# Patient Record
Sex: FEMALE | Race: WHITE | Employment: PART TIME | URBAN - NONMETROPOLITAN AREA
[De-identification: names, ages, dates, MRNs, and addresses within clinical notes are randomized per-mention and may not be internally consistent; named-entity substitution may affect disease eponyms.]

---

## 2020-08-28 ENCOUNTER — HOSPITAL ENCOUNTER (INPATIENT)
Age: 20
LOS: 4 days | Discharge: HOME | DRG: 885 | End: 2020-09-01
Attending: PSYCHIATRY & NEUROLOGY | Admitting: PSYCHIATRY & NEUROLOGY
Payer: COMMERCIAL

## 2020-08-28 PROBLEM — F32.9 MAJOR DEPRESSION, CHRONIC: Status: ACTIVE | Noted: 2020-08-28

## 2020-08-28 LAB
ALBUMIN SERPL-MCNC: 4.6 G/DL (ref 3.5–5.1)
ALP BLD-CCNC: 70 U/L (ref 38–126)
ALT SERPL-CCNC: 18 U/L (ref 11–66)
AMPHETAMINE+METHAMPHETAMINE URINE SCREEN: NEGATIVE
ANION GAP SERPL CALCULATED.3IONS-SCNC: 9 MEQ/L (ref 8–16)
AST SERPL-CCNC: 19 U/L (ref 5–40)
BARBITURATE QUANTITATIVE URINE: NEGATIVE
BASOPHILS # BLD: 0.2 %
BASOPHILS ABSOLUTE: 0 THOU/MM3 (ref 0–0.1)
BENZODIAZEPINE QUANTITATIVE URINE: NEGATIVE
BILIRUB SERPL-MCNC: 0.4 MG/DL (ref 0.3–1.2)
BILIRUBIN DIRECT: < 0.2 MG/DL (ref 0–0.3)
BILIRUBIN URINE: NEGATIVE
BLOOD, URINE: NEGATIVE
BUN BLDV-MCNC: 10 MG/DL (ref 7–22)
CALCIUM SERPL-MCNC: 10.1 MG/DL (ref 8.5–10.5)
CANNABINOID QUANTITATIVE URINE: NEGATIVE
CHARACTER, URINE: CLEAR
CHLORIDE BLD-SCNC: 104 MEQ/L (ref 98–111)
CO2: 25 MEQ/L (ref 23–33)
COCAINE METABOLITE QUANTITATIVE URINE: NEGATIVE
COLOR: YELLOW
CREAT SERPL-MCNC: 0.9 MG/DL (ref 0.4–1.2)
EOSINOPHIL # BLD: 3.7 %
EOSINOPHILS ABSOLUTE: 0.3 THOU/MM3 (ref 0–0.4)
ERYTHROCYTE [DISTWIDTH] IN BLOOD BY AUTOMATED COUNT: 13.1 % (ref 11.5–14.5)
ERYTHROCYTE [DISTWIDTH] IN BLOOD BY AUTOMATED COUNT: 43.8 FL (ref 35–45)
ETHYL ALCOHOL, SERUM: < 0.01 %
GFR SERPL CREATININE-BSD FRML MDRD: 79 ML/MIN/1.73M2
GLUCOSE BLD-MCNC: 97 MG/DL (ref 70–108)
GLUCOSE URINE: NEGATIVE MG/DL
HCT VFR BLD CALC: 46.1 % (ref 37–47)
HEMOGLOBIN: 15 GM/DL (ref 12–16)
IMMATURE GRANS (ABS): 0.02 THOU/MM3 (ref 0–0.07)
IMMATURE GRANULOCYTES: 0.2 %
KETONES, URINE: NEGATIVE
LEUKOCYTE ESTERASE, URINE: NEGATIVE
LYMPHOCYTES # BLD: 27.7 %
LYMPHOCYTES ABSOLUTE: 2.5 THOU/MM3 (ref 1–4.8)
MAGNESIUM: 2.2 MG/DL (ref 1.6–2.4)
MCH RBC QN AUTO: 29.8 PG (ref 26–33)
MCHC RBC AUTO-ENTMCNC: 32.5 GM/DL (ref 32.2–35.5)
MCV RBC AUTO: 91.7 FL (ref 81–99)
MONOCYTES # BLD: 6.9 %
MONOCYTES ABSOLUTE: 0.6 THOU/MM3 (ref 0.4–1.3)
NITRITE, URINE: NEGATIVE
NUCLEATED RED BLOOD CELLS: 0 /100 WBC
OPIATES, URINE: NEGATIVE
OSMOLALITY CALCULATION: 274.6 MOSMOL/KG (ref 275–300)
OXYCODONE: NEGATIVE
PH UA: 6 (ref 5–9)
PHENCYCLIDINE QUANTITATIVE URINE: NEGATIVE
PLATELET # BLD: 385 THOU/MM3 (ref 130–400)
PMV BLD AUTO: 9.2 FL (ref 9.4–12.4)
POTASSIUM SERPL-SCNC: 4.6 MEQ/L (ref 3.5–5.2)
PREGNANCY, SERUM: NEGATIVE
PROTEIN UA: NEGATIVE
RBC # BLD: 5.03 MILL/MM3 (ref 4.2–5.4)
SEG NEUTROPHILS: 61.3 %
SEGMENTED NEUTROPHILS ABSOLUTE COUNT: 5.6 THOU/MM3 (ref 1.8–7.7)
SODIUM BLD-SCNC: 138 MEQ/L (ref 135–145)
SPECIFIC GRAVITY, URINE: 1.01 (ref 1–1.03)
TOTAL PROTEIN: 7.9 G/DL (ref 6.1–8)
UROBILINOGEN, URINE: 0.2 EU/DL (ref 0–1)
WBC # BLD: 9.2 THOU/MM3 (ref 4.8–10.8)

## 2020-08-28 PROCEDURE — 1240000000 HC EMOTIONAL WELLNESS R&B

## 2020-08-28 PROCEDURE — 99284 EMERGENCY DEPT VISIT MOD MDM: CPT

## 2020-08-28 PROCEDURE — 85025 COMPLETE CBC W/AUTO DIFF WBC: CPT

## 2020-08-28 PROCEDURE — 6370000000 HC RX 637 (ALT 250 FOR IP): Performed by: PSYCHIATRY & NEUROLOGY

## 2020-08-28 PROCEDURE — 99285 EMERGENCY DEPT VISIT HI MDM: CPT

## 2020-08-28 PROCEDURE — 80307 DRUG TEST PRSMV CHEM ANLYZR: CPT

## 2020-08-28 PROCEDURE — 81003 URINALYSIS AUTO W/O SCOPE: CPT

## 2020-08-28 PROCEDURE — 36415 COLL VENOUS BLD VENIPUNCTURE: CPT

## 2020-08-28 PROCEDURE — 84703 CHORIONIC GONADOTROPIN ASSAY: CPT

## 2020-08-28 PROCEDURE — 83735 ASSAY OF MAGNESIUM: CPT

## 2020-08-28 PROCEDURE — G0480 DRUG TEST DEF 1-7 CLASSES: HCPCS

## 2020-08-28 PROCEDURE — 82248 BILIRUBIN DIRECT: CPT

## 2020-08-28 PROCEDURE — 80053 COMPREHEN METABOLIC PANEL: CPT

## 2020-08-28 PROCEDURE — 6370000000 HC RX 637 (ALT 250 FOR IP): Performed by: PHYSICIAN ASSISTANT

## 2020-08-28 RX ORDER — VENLAFAXINE HYDROCHLORIDE 150 MG/1
150 CAPSULE, EXTENDED RELEASE ORAL
Status: DISCONTINUED | OUTPATIENT
Start: 2020-08-29 | End: 2020-08-29

## 2020-08-28 RX ORDER — FERROUS SULFATE 325(65) MG
325 TABLET ORAL
Status: DISCONTINUED | OUTPATIENT
Start: 2020-08-29 | End: 2020-09-01 | Stop reason: HOSPADM

## 2020-08-28 RX ORDER — FERROUS SULFATE 325(65) MG
325 TABLET ORAL
COMMUNITY

## 2020-08-28 RX ORDER — VENLAFAXINE HYDROCHLORIDE 150 MG/1
150 CAPSULE, EXTENDED RELEASE ORAL NIGHTLY
Status: ON HOLD | COMMUNITY
End: 2020-09-01 | Stop reason: HOSPADM

## 2020-08-28 RX ORDER — MAGNESIUM HYDROXIDE/ALUMINUM HYDROXICE/SIMETHICONE 120; 1200; 1200 MG/30ML; MG/30ML; MG/30ML
30 SUSPENSION ORAL EVERY 6 HOURS PRN
Status: DISCONTINUED | OUTPATIENT
Start: 2020-08-28 | End: 2020-09-01 | Stop reason: HOSPADM

## 2020-08-28 RX ORDER — SUMATRIPTAN 25 MG/1
25 TABLET, FILM COATED ORAL
COMMUNITY

## 2020-08-28 RX ORDER — SUMATRIPTAN 50 MG/1
25 TABLET, FILM COATED ORAL
Status: DISPENSED | OUTPATIENT
Start: 2020-08-28 | End: 2020-08-28

## 2020-08-28 RX ORDER — BUPROPION HYDROCHLORIDE 300 MG/1
300 TABLET ORAL EVERY MORNING
COMMUNITY

## 2020-08-28 RX ORDER — TRAZODONE HYDROCHLORIDE 50 MG/1
50 TABLET ORAL NIGHTLY PRN
Status: DISCONTINUED | OUTPATIENT
Start: 2020-08-28 | End: 2020-09-01 | Stop reason: HOSPADM

## 2020-08-28 RX ORDER — BUPROPION HYDROCHLORIDE 300 MG/1
300 TABLET ORAL EVERY MORNING
Status: DISCONTINUED | OUTPATIENT
Start: 2020-08-29 | End: 2020-09-01 | Stop reason: HOSPADM

## 2020-08-28 RX ORDER — HYDROXYZINE HYDROCHLORIDE 25 MG/1
50 TABLET, FILM COATED ORAL 3 TIMES DAILY PRN
Status: DISCONTINUED | OUTPATIENT
Start: 2020-08-28 | End: 2020-09-01 | Stop reason: HOSPADM

## 2020-08-28 RX ORDER — ACETAMINOPHEN 325 MG/1
650 TABLET ORAL EVERY 4 HOURS PRN
Status: DISCONTINUED | OUTPATIENT
Start: 2020-08-28 | End: 2020-09-01 | Stop reason: HOSPADM

## 2020-08-28 RX ORDER — IBUPROFEN 200 MG
400 TABLET ORAL EVERY 6 HOURS PRN
Status: DISCONTINUED | OUTPATIENT
Start: 2020-08-28 | End: 2020-09-01 | Stop reason: HOSPADM

## 2020-08-28 RX ADMIN — METFORMIN HYDROCHLORIDE 500 MG: 500 TABLET ORAL at 17:15

## 2020-08-28 RX ADMIN — TRAZODONE HYDROCHLORIDE 50 MG: 50 TABLET ORAL at 21:02

## 2020-08-28 RX ADMIN — HYDROXYZINE HYDROCHLORIDE 50 MG: 25 TABLET ORAL at 21:02

## 2020-08-28 ASSESSMENT — SLEEP AND FATIGUE QUESTIONNAIRES
DO YOU HAVE DIFFICULTY SLEEPING: YES
DIFFICULTY FALLING ASLEEP: NO
DIFFICULTY ARISING: NO
DO YOU USE A SLEEP AID: YES
AVERAGE NUMBER OF SLEEP HOURS: 6
DO YOU USE A SLEEP AID: NO
DIFFICULTY STAYING ASLEEP: YES
RESTFUL SLEEP: NO
DIFFICULTY ARISING: NO
DO YOU HAVE DIFFICULTY SLEEPING: YES
DIFFICULTY STAYING ASLEEP: YES
SLEEP PATTERN: RESTLESSNESS
AVERAGE NUMBER OF SLEEP HOURS: 7
RESTFUL SLEEP: NO
SLEEP PATTERN: DISTURBED/INTERRUPTED SLEEP
DIFFICULTY FALLING ASLEEP: NO

## 2020-08-28 ASSESSMENT — PATIENT HEALTH QUESTIONNAIRE - PHQ9
SUM OF ALL RESPONSES TO PHQ QUESTIONS 1-9: 19
SUM OF ALL RESPONSES TO PHQ QUESTIONS 1-9: 18
SUM OF ALL RESPONSES TO PHQ QUESTIONS 1-9: 22

## 2020-08-28 ASSESSMENT — ENCOUNTER SYMPTOMS
SORE THROAT: 0
EYE ITCHING: 0
EYE DISCHARGE: 0
WHEEZING: 0
DIARRHEA: 0
ABDOMINAL PAIN: 0
COUGH: 0
EYE PAIN: 0
VOMITING: 0
SHORTNESS OF BREATH: 0
RHINORRHEA: 0
NAUSEA: 0
BACK PAIN: 0
COLOR CHANGE: 0

## 2020-08-28 ASSESSMENT — LIFESTYLE VARIABLES
HISTORY_ALCOHOL_USE: NO
HISTORY_ALCOHOL_USE: NO

## 2020-08-28 NOTE — PROGRESS NOTES
Provisional Diagnosis:    Unspecified Depressive Disorder     Risk, Psychosocial and Contextual Factors:  Adjusting to online schooling due to the pandemic, work, problems with primary support system    Current MH Treatment:  Dr. Rosalinda Rahman (specialize in psychology, counsels pt). Psychotropic medication prescribed by PCP     Present Suicidal Behavior:      Verbal:  Denies current suicidal thoughts. Pt report daily, intermittent suicidal thoughts during the last two weeks        Attempt: Denies    Access to Weapons:  Pt state there are guns in the home \"but I don't know how to use them\"    Current Suicide Risk: Low, Moderate or High:    High    Past Suicidal Behavior:       Verbal: X    Attempt: Denies     Self-Injurious/Self-Mutilation:  Denies     Traumatic Event Within Past 2 Weeks:   Denies     Current Abuse:  Denies     Legal:  Denies     Violence: Denies. Pt had thoughts of wanting to kill her father a year ago, no plan. Pt state this was due to her father cheating on her mother who is still not aware of the infidelity    Protective Factors:  Pt is linked to counseling services     Housing:    Pt resides with her mother, father, sister and brother     CPAP/Oxygen/Ambulation Difficulties:  Denies     Basic Vital Signs Normal?: Check with Patients Nurse prior to [address]?: Check with Patients Nurse prior to Calling Psychiatry    Clinical Summary:      Pt is a 20year old female escorted to [facility] by her mother as advised by her PCP due to intermittent suicidal thoughts. Pt report intermittent suicidal thoughts daily, two month duration, denies current thoughts. Pt reportedly thought about overdosing on her medication last night \"then going to bed early, before everyone else\". Pt state if she gets upset enough \"I believe I could do it\". Homicidal thoughts denied, hallucinations denied, no delusions noted.  Pt denies a history of inpatient psychiatric treatment, denies history of

## 2020-08-28 NOTE — ED TRIAGE NOTES
Patient arrived to room 26 with c/o suicidal thoughts and psychiatric evaluation. Patient stated she has been thinking about this for a while no but denies a plan or acting on anything at this time. Patient stated she feels very depressed and overwhelmed. Patient's mother is at bedside with patient.

## 2020-08-28 NOTE — ED PROVIDER NOTES
[facility] [address] COMPLAINT       Chief Complaint   Patient presents with    Suicidal    Psychiatric Evaluation       Nurses Notes reviewed and I agree except as notedin the HPI. HISTORY OF PRESENT ILLNESS    Randall Mcdonald is a 20 y.o. female who presents has been depressed for last several months. She states that she goes to school full-time for biology and works at a coffee shop full-time. She states that she has not been having thoughts of hurting herself. She is taken all of her pills. Her mother states that she is put a lot of pressure on herself. She is currently without physical complaints. Location/Symptom: Depression  Timing/Onset: worse today  Context/Setting: home  Quality: suicidal thoughts  Duration: none  Modifying Factors: none  Severity: none    REVIEW OF SYSTEMS     Review of Systems   Constitutional: Negative for activity change, appetite change, chills and fever. HENT: Negative for congestion, ear pain, rhinorrhea and sore throat. Eyes: Negative for pain, discharge and itching. Respiratory: Negative for cough, shortness of breath and wheezing. Cardiovascular: Negative for chest pain. Gastrointestinal: Negative for abdominal pain, diarrhea, nausea and vomiting. Genitourinary: Negative for difficulty urinating and dysuria. Musculoskeletal: Negative for arthralgias, back pain and myalgias. Skin: Negative for color change and rash. Neurological: Negative for dizziness, seizures, light-headedness and headaches. Psychiatric/Behavioral: Positive for dysphoric mood and suicidal ideas. Negative for agitation, confusion and self-injury. All other systems reviewed and are negative. PAST MEDICAL HISTORY    has a past medical history of Anxiety, Anxiety, Depression, Headache, Iron deficiency, Mood disorder ([address] and PCOS (polycystic ovarian syndrome).     SURGICAL HISTORY      has a past surgical history that includes Tonsillectomy. CURRENT MEDICATIONS       Current Discharge Medication List      CONTINUE these medications which have NOT CHANGED    Details   venlafaxine (EFFEXOR XR) 150 MG extended release capsule Take 150 mg by mouth nightly       ferrous sulfate (IRON 325) 325 (65 Fe) MG tablet Take 325 mg by mouth daily (with breakfast)      SUMAtriptan (IMITREX) 25 MG tablet Take 25 mg by mouth once as needed for Migraine      metFORMIN (GLUCOPHAGE) 500 MG tablet Take 500 mg by mouth Daily with supper       buPROPion (WELLBUTRIN XL) 300 MG extended release tablet Take 300 mg by mouth every morning             ALLERGIES     has No Known Allergies. HISTORY     She indicated that her mother is alive. She indicated that her father is alive. She indicated that her sister is alive. She indicated that her brother is alive. family history includes Depression in her father and mother; Other in her brother and sister. SOCIALHISTORY      reports that she has never smoked. She has never used smokeless tobacco. She reports previous alcohol use. She reports previous drug use. PHYSICAL EXAM     INITIAL VITALS:  height is 5' 5\" (1.651 m) and weight is 213 lb 5 oz (96.8 kg). Her oral temperature is 98.5 °F (36.9 °C). Her blood pressure is 126/76 and her pulse is 91. Her respiration is 16 and oxygen saturation is 98%. Physical Exam  Vitals signs and nursing note reviewed. Constitutional:       Appearance: She is well-developed. HENT:      Head: Normocephalic and atraumatic. Right Ear: Tympanic membrane normal.      Left Ear: Tympanic membrane normal.   Eyes:      Pupils: Pupils are equal, round, and reactive to light. Neck:      Musculoskeletal: Normal range of motion and neck supple. Cardiovascular:      Rate and Rhythm: Normal rate and regular rhythm. Pulmonary:      Effort: Pulmonary effort is normal. No respiratory distress. Breath sounds: Normal breath sounds. No stridor.    Abdominal: follow-up provider specified.     DISCHARGE MEDICATIONS:  Current Discharge Medication List          (Please note that portions of this note were completed with a voice recognitionprogram.  Efforts were made to edit the dictations but occasionally words are mis-transcribed.)    SAPPHIRE Alva [facility], [address]  08/28/20 3607

## 2020-08-28 NOTE — PROGRESS NOTES
Behavioral Health   Admission Note     Admission Type:   Admission Type: Voluntary    Reason for admission:  Reason for Admission: major depressive disorder    PATIENT STRENGTHS:  Strengths: Connection to output provider, Positive Support, Communication    Patient Strengths and Limitations:  Limitations: External locus of control    Addictive Behavior:   Addictive Behavior  In the past 3 months, have you felt or has someone told you that you have a problem with:  : None  Do you have a history of Chemical Use?: No  Do you have a history of Alcohol Use?: No  Do you have a history of Street Drug Abuse?: No  Histroy of Prescripton Drug Abuse?: No    Medical Problems:   Past Medical History:   Diagnosis Date    Anxiety     Anxiety     Depression     Headache     Iron deficiency     Mood disorder (HCC)     PCOS (polycystic ovarian syndrome)        Status EXAM:  Status and Exam  Normal: No  Facial Expression: Flat  Affect: Appropriate  Level of Consciousness: Alert  Mood:Normal: No  Mood: Depressed  Motor Activity:Normal: Yes  Interview Behavior: Cooperative  Preception: [place] to Person, [place] to Time, [place] to Place, [place] to Situation  Attention:Normal: Yes  Thought Processes: (Linear)  Thought Content:Normal: No  Hallucinations: None  Delusions: No  Memory:Normal: Yes  Insight and Judgment: Yes  Insight and Judgment: Poor Judgment  Present Suicidal Ideation: No  Present Homicidal Ideation: No    Pt admitted with followings belongings:  Dentures: None  Vision - Corrective Lenses: None  Hearing Aid: None  Jewelry: Earrings  Body Piercings Removed: N/A  Clothing: Footwear  Were All Patient Medications Collected?: Not Applicable  Other Valuables: None     Admission order obtained yes . Valuables sent home with n/a  Valuables placed in safe in security envelope, number:  N/a  Patient's home medications were n/a   Patient oriented to surroundings and program expectations and copy of patient rights given.  Received admission packet:  Yes . Consents reviewed, signed yes. . Patient verbalize understanding:  yes. Patient education on precautions yes          Patient screened positive for suicide risk on CSSR-S (\"yes\" to question #4, 5, OR 6)  NO. Physician notified of risk score. Orders received NO   2 person skin assessment completed upon admission  Refused     Explained patients right to have family, representative or physician notified of their admission. Patient has Declined for physician to be notified. Patient has Declined for family/representative to be notified. Provided pt with [employer] handout entitled \"Quitting Smoking. \"  Reviewed handout with pt addressing dangers of smoking, developing coping skills, and providing basic information about quitting. Pt response to counseling:  Does ot smoke    Admit from ED. Pt went to PCP with c/o depression. Pt has hx of self harm by cutting no hx of SI. Pt lives at home and goes to college and works part time. Pt sees therapist at PCP office.  Pt reported med compliance           Mandy Hutchison RN

## 2020-08-29 LAB — GLUCOSE BLD-MCNC: 102 MG/DL (ref 70–108)

## 2020-08-29 PROCEDURE — 6370000000 HC RX 637 (ALT 250 FOR IP): Performed by: NURSE PRACTITIONER

## 2020-08-29 PROCEDURE — 82948 REAGENT STRIP/BLOOD GLUCOSE: CPT

## 2020-08-29 PROCEDURE — 6370000000 HC RX 637 (ALT 250 FOR IP): Performed by: PHYSICIAN ASSISTANT

## 2020-08-29 PROCEDURE — 1240000000 HC EMOTIONAL WELLNESS R&B

## 2020-08-29 PROCEDURE — 6370000000 HC RX 637 (ALT 250 FOR IP): Performed by: PSYCHIATRY & NEUROLOGY

## 2020-08-29 PROCEDURE — 90792 PSYCH DIAG EVAL W/MED SRVCS: CPT | Performed by: NURSE PRACTITIONER

## 2020-08-29 PROCEDURE — 99222 1ST HOSP IP/OBS MODERATE 55: CPT | Performed by: PSYCHIATRY & NEUROLOGY

## 2020-08-29 RX ORDER — VENLAFAXINE HYDROCHLORIDE 75 MG/1
75 CAPSULE, EXTENDED RELEASE ORAL
Status: DISCONTINUED | OUTPATIENT
Start: 2020-08-29 | End: 2020-08-29

## 2020-08-29 RX ADMIN — METFORMIN HYDROCHLORIDE 500 MG: 500 TABLET ORAL at 19:18

## 2020-08-29 RX ADMIN — VENLAFAXINE HYDROCHLORIDE 225 MG: 150 CAPSULE, EXTENDED RELEASE ORAL at 09:46

## 2020-08-29 RX ADMIN — IBUPROFEN 400 MG: 200 TABLET, FILM COATED ORAL at 19:59

## 2020-08-29 RX ADMIN — FERROUS SULFATE TAB 325 MG (65 MG ELEMENTAL FE) 325 MG: 325 (65 FE) TAB at 09:46

## 2020-08-29 RX ADMIN — TRAZODONE HYDROCHLORIDE 50 MG: 50 TABLET ORAL at 21:27

## 2020-08-29 RX ADMIN — BUPROPION HYDROCHLORIDE 300 MG: 300 TABLET, EXTENDED RELEASE ORAL at 09:46

## 2020-08-29 RX ADMIN — IBUPROFEN 400 MG: 200 TABLET, FILM COATED ORAL at 15:33

## 2020-08-29 RX ADMIN — IBUPROFEN 400 MG: 200 TABLET, FILM COATED ORAL at 09:49

## 2020-08-29 ASSESSMENT — SLEEP AND FATIGUE QUESTIONNAIRES
DIFFICULTY STAYING ASLEEP: YES
DIFFICULTY ARISING: NO
DO YOU USE A SLEEP AID: YES
SLEEP PATTERN: DISTURBED/INTERRUPTED SLEEP
RESTFUL SLEEP: NO
DIFFICULTY FALLING ASLEEP: NO
DO YOU HAVE DIFFICULTY SLEEPING: YES

## 2020-08-29 ASSESSMENT — PAIN DESCRIPTION - ONSET: ONSET: GRADUAL

## 2020-08-29 ASSESSMENT — LIFESTYLE VARIABLES: HISTORY_ALCOHOL_USE: NO

## 2020-08-29 ASSESSMENT — PATIENT HEALTH QUESTIONNAIRE - PHQ9: SUM OF ALL RESPONSES TO PHQ QUESTIONS 1-9: 19

## 2020-08-29 ASSESSMENT — PAIN DESCRIPTION - FREQUENCY: FREQUENCY: INTERMITTENT

## 2020-08-29 ASSESSMENT — PAIN DESCRIPTION - PROGRESSION
CLINICAL_PROGRESSION: NOT CHANGED
CLINICAL_PROGRESSION: RESOLVED

## 2020-08-29 ASSESSMENT — PAIN DESCRIPTION - LOCATION: LOCATION: HEAD

## 2020-08-29 ASSESSMENT — PAIN SCALES - GENERAL
PAINLEVEL_OUTOF10: 0
PAINLEVEL_OUTOF10: 5
PAINLEVEL_OUTOF10: 8
PAINLEVEL_OUTOF10: 8
PAINLEVEL_OUTOF10: 4

## 2020-08-29 ASSESSMENT — PAIN DESCRIPTION - PAIN TYPE: TYPE: ACUTE PAIN

## 2020-08-29 ASSESSMENT — PAIN - FUNCTIONAL ASSESSMENT: PAIN_FUNCTIONAL_ASSESSMENT: ACTIVITIES ARE NOT PREVENTED

## 2020-08-29 ASSESSMENT — PAIN DESCRIPTION - ORIENTATION: ORIENTATION: ANTERIOR

## 2020-08-29 ASSESSMENT — PAIN DESCRIPTION - DESCRIPTORS: DESCRIPTORS: HEADACHE

## 2020-08-29 NOTE — PLAN OF CARE
LPN  Outcome: Ongoing  Note: Patient voices her best friend as support. Goal: Absence of self-harm  Description: Absence of self-harm  8/29/2020 1120 by Amee Mcgarry LPN  Outcome: Ongoing  Note: No self harm behaviors were observed or reported so far this shift. Remains on every 15 minutes precautions for safety. 8/28/2020 2142 by Gabriel Raphael LPN  Outcome: Ongoing  Note: Patient absent of self harm. Goal: Patient specific goal  Description: Patient specific goal  8/29/2020 1120 by Amee Mcgarry LPN  Outcome: Ongoing  Note: Patient specific goal stated   8/28/2020 2142 by Gabriel Raphael LPN  Outcome: Ongoing  Note: Patient new to unit. Encouraged to set a goal for tomorrow. Goal: Participates in care planning  Description: Participates in care planning  8/29/2020 1120 by Amee Mcgarry LPN  Outcome: Ongoing  Note: Patient did participate in care plan  8/28/2020 2142 by Gabriel Raphael LPN  Outcome: Ongoing  Note: Patient new to unit. She cooperative, attending groups and taking medications without difficulties. Problem: Anxiety:  Goal: Level of anxiety will decrease  Description: Level of anxiety will decrease  Outcome: Ongoing  Note: Pt level of anxiety will decrease      Problem: Activity:  Goal: Sleeping patterns will improve  Description: Sleeping patterns will improve  Outcome: Ongoing  Note: Patient sleeping patterns will improve     Problem: KNOWLEDGE DEFICIT,EDUCATION,DISCHARGE PLAN  Goal: Knowledge - personal safety  Outcome: Ongoing  Note: Personal safety plan not completed at this time    Care plan reviewed with patient and . Patient and  verbalize understanding of the plan of care and contribute to goal setting.

## 2020-08-29 NOTE — BH NOTE
INPATIENT RECREATIONAL THERAPY  ADULT BEHAVIORAL SERVICES  EVALUATION    REFERRING PHYSICIAN:  Dr. Itz Zhao  DIAGNOSIS:   Major Depression, chronic  PRECAUTIONS:  standard precautions    HISTORY OF PRESENT ILLNESS/INJURY: Patient presented to ED with mother, reported worsening depression nd has suiicdal ideations of wanting to OD on her meds and go to sleep early before everyone else. Reports struggling with depression for years. Reports depression worsened 2 mos ago due to family conflict and job at [employer]. Reports with addiction of college classes that recently started. PMH:  Please see medical chart for prior medical history, allergies, and medications. HISTORY OF PSYCHIATRIC TREATMENT: Denies any past psych hospitalizations. Denies any past suicidal gestures, Reports scrathes self when ges stressed. Denies ever being under care of psychiatrist. Reports receives counseling at PCP office every 2 weeks. YOB: 2000  GENDER:  Female    MARITAL STATUS:  Single  EMPLOYMENT STATUS:  Reports being a  at [employer] part time  LIVING SITUATION/SUPPORT:  Pt reports living with parents  EDUCATIONAL LEVEL: Pt reports 3rd years at [employer] in [facility]  MEDICATION/DRUG USE: Pt denies any substance abuse and alcohol abuse. LEISURE INTERESTS:  reading, spending time with friends, getting a head start on school work  ACTIVITY PREFERENCE: no preference  ACTIVITY TYPES:  passive, active, indoor, outdoor  COGNITION: A&Ox4    COPING: poor  ATTENTION: fair  RELAXATION: pt reports hx of poor sleep, anxiety, paranoia, but reports it is not current  SELF-ESTEEM: poor  MOTIVATION:  fair    SOCIAL SKILLS:  fair  FRUSTRATION TOLERANCE:  No hx of violence documented  ATTENTION SEEKING: Pt reports scratching herself when stressed  COOPERATION: Pt is cooperative and pleasant  AFFECT: Pt displays a congruent and reactive affect.   APPEARANCE: pt displayed fair grooming and hygiene and is

## 2020-08-29 NOTE — GROUP NOTE
Group Therapy Note    Date: 8/29/2020    Group Start Time: 1400  Group End Time: 1430  Group Topic: Recreational    [facility] Adult Psych 4E    TERRI Choi    Group Therapy Note    Attendees: 3         Patient's Goal:  Pt will improve socialization and knowledge of coping skills through participation of mindfulness and focus on coping with stressors. Notes:  Pt displayed active participation and was interactive with peers. Pt participated in group discussion and discussed stress and having a balance of stress in life. Status After Intervention:  Improved    Participation Level:  Active Listener and Interactive    Participation Quality: Appropriate, Attentive and Sharing      Speech:  normal      Thought Process/Content: Logical      Affective Functioning: Congruent      Mood: euthymic      Level of consciousness:  Alert, Oriented x4 and Attentive      Response to Learning: Able to verbalize current knowledge/experience, Able to verbalize/acknowledge new learning, Able to retain information, Capable of insight, Able to change behavior and Progressing to goal      Endings: None Reported    Modes of Intervention: Education, Support, Socialization, Exploration, Clarifying, Activity and Confrontation      Discipline Responsible: Psychoeducational Specialist      Signature:  TERRI Dunne

## 2020-08-29 NOTE — PROGRESS NOTES
Group Therapy Note    Date: 8/28/2020  Start Time: 2000  End Time: 2020    Type of Group: Relaxation    Patient's Goal: Patient new to the unit     Status After Intervention:  Improved    Participation Level:  Active Listener    Participation Quality: Sharing    Speech:  normal    Thought Process/Content: Logical    Affective Functioning: Congruent    Mood: depressed    Level of consciousness:  Alert    Response to Learning: Able to verbalize/acknowledge new learning    Endings: None Reported    Modes of Intervention: Education    Discipline Responsible: Licensed Practical Nurse      Signature:  Alma Delia Eli LPN

## 2020-08-29 NOTE — PROGRESS NOTES
This RN has reviewed and agrees with Tim Roberts LPN's data collection and has collaborated with this LPN regarding the patient's care plan.

## 2020-08-29 NOTE — PATIENT CARE CONFERENCE
[address] [facility]  Initial Interdisciplinary Treatment Plan NOTE    Review Date & Time: 8/29/2020 10:00 AM    Patient was in treatment team.  See Multidisciplinary Treatment Team sheet for participants. Admission Type:   Admission Type: Voluntary    Reason for admission:  Reason for Admission: major depressive disorder      Estimated Length of Stay Update:  3-5 days   Estimated Discharge Date Update: 3-5 days     PATIENT STRENGTHS:  Patient Strengths Strengths: Communication, Connection to output provider, Employment, Positive Support  Patient Strengths and Limitations:Limitations: External locus of control  Addictive Behavior:Addictive Behavior  In the past 3 months, have you felt or has someone told you that you have a problem with:  : None  Do you have a history of Chemical Use?: No  Do you have a history of Alcohol Use?: No  Do you have a history of Street Drug Abuse?: No  Histroy of Prescripton Drug Abuse?: No  Medical Problems:  Past Medical History:   Diagnosis Date    Anxiety     Anxiety     Depression     Headache     Iron deficiency     Mood disorder (HCC)     PCOS (polycystic ovarian syndrome)        EDUCATION:   Learner Progress Toward Treatment Goals: Reviewed results and recommendations of this team, Reviewed group plan and strategies, Reviewed signs, symptoms and risk of self harm and violent behavior and Reviewed goals and plan of care    Method: Individual    Outcome: Verbalized understanding and Demonstrated Understanding    PATIENT GOALS: Learn better coping mechanisms     PLAN/TREATMENT RECOMMENDATIONS UPDATE:   1. What is the most important thing we can help you with while you are here? See above   2. Who is your support system? Friend and father  3. Do you have follow-up providers? Dr. Aislinn Cintron  4. Do you have the ability to pay for your medications? Yes [facility]  5. Where will you be residing when you leave the hospital? At home   6.  Will need a return to work slip or FMLA paper completion?  Yes       GOALS UPDATE:   Time frame for Short-Term Goals: ongoing     [facility], [address]

## 2020-08-29 NOTE — PROGRESS NOTES
Discharge planning:   Call Dr. Alaina Holliday office on monday to follow-up on referal made by Carlos Sales

## 2020-08-29 NOTE — H&P
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 Reports struggling with depression for years. Reports depression worsened 2 mos ago due to family conflict and job at [employer]. Reports with addiction of college classes that recently started. Reports feeling overwhelmed and unable to deal with stressors. Cy=urrently Rxed Effexor and Wellbutrin from PCP. States she likes her meds but feels need for dose adjustment. Upon admission to E4 psychiatric unit:  Reyes Fletcher denies feelings of harm towards self or others. Reports still feeling depressed. Talked about her job at [employer] being stressful and her sister  and her uncles owns [employer] shop. Felels her family is not always supportive. Reports appetite is good and slept well last night. Verified slept 8 hours. Labs reviewed drawn 8-28-20 Reflect no critical abnormals. UDS negative for illicit substances. Pregnancy negative. Reyes Fletcher is receptive for increased dose of Effexor for depression     Past Medical Hx:  See ED note     Past Psychiatric Hx:  Denies any past psych hospitalizations. Denies any past suicidal gestures, Reports scrathes self when ges stressed. Denies ever being under care of psychiatrist. Reports receives counseling at PCP office every 2 weeks. Reports Reports being on Effexor x 1 year and Wellbutrin x 4 mos. Pat psych meds: Lexapro, Abilify zoloft    Family Hx:  Reports  Father has Depression  Reports father has anxiety and depression     Social Hx:  TOBACCO: Denies use of Tobacco products   ETOH: Denies use   DRUGS: Denies bridgette of illicit substances   MARITAL STATUS: Never marred.  Ciurrently not in a relationship  OCCUPATION: Reports being a [employer] at [employer]   LEVEL OF EDUCATION: Reports 3rd year at [address]. LIVING SITUATION: Lives with parents in [place], [place] denies ahving any children. LEGAL:Denies ever being arrested. MSE:  Level of consciousness: Alert  Appearance: in chair and fair grooming   Behavior/Motor: no abnormalities noted   Attitude toward examiner: cooperative   Speech: Normal volume, goal directed, NRR  Mood: Euthymic  Affect: Reactive  Thought processes: Linear and goal directed   Suicidal Ideation: Denies suicidal ideations  Homicidal ideation: Denies homicidal ideations  Delusions: No evidence of delusions is observed  Perceptual Disturbance: Denies AH/VH;  No evidence of psychosis is observed. Cognition: Oriented to person, place, time and situation   Concentration fair   Memory intact   Insight: Fair  Judgment: Fair    ROS:  Constitutional: Appears well-developed; No acute distress  HENT:   Head: Normocephalic and atraumatic. Negative for ear pain, congestion, rhinorrhea and neck pain. Eyes: Conjunctivae are normal.  no discharge noted from eyes bilat. . No scleral icterus. Neck: Normal range of motion. Pulmonary/Chest:  No respiratory distress or accessory muscle use, no wheezing. Abdominal: No distension. Musculoskeletal: Normal range of motion. He exhibits no edema. Negative for myalgias, back pain and arthralgias. Neurological: cranial nerves II-XII grossly in tact, normal gait and station. Negative for dizziness, weakness or headaches. Skin: Skin is warm and dry. Not diaphoretic. No erythema. Cardiovascular: Negative for chest pain, palpitations and leg swelling. Gastrointestinal: Negative for nausea, vomiting and diarrhea. Genitourinary: Negative for dysuria and frequency. 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 Plan:  Admit to [facility] psychiatric unit for symptom stabilization and medication management. Introduce to unit milieu, groups and individual therapies.   Increase Effexor XR 225mg po q am  Cont Wellbutrin XL 300mg po q am        [employer] Certification     Admission Day 1  I certify that this patient's inpatient psychiatric hospital admission is medically necessary for:    (1) treatment which could reasonably be expected to improve this patient's condition, or    (2) diagnostic study or its equivalent.        Physicians Signature: Electronically signed by Ayse Webb, CNP 6-

## 2020-08-29 NOTE — PROGRESS NOTES
This RN has reviewed and agrees with DAYAN Silvestre LPN's data collection and has collaborated with this LPN regarding the patient's care plan.

## 2020-08-29 NOTE — GROUP NOTE
Group Therapy Note    Date: 8/29/2020    Group Start Time: 1040  Group End Time: 6093  Group Topic: [address] Adult Psych 4E    [address], [facility]    Group Therapy Note    Attendees: 3         Pt did not attend goal group and community meeting. Pt received maximum encouragement to attend group. Pt participated in small 1:1 session to identify a daily goal.  Pts goal is \"think of coping mechanisms today. \"    Signature:  Torri Sauceda

## 2020-08-30 LAB — GLUCOSE BLD-MCNC: 106 MG/DL (ref 70–108)

## 2020-08-30 PROCEDURE — 6370000000 HC RX 637 (ALT 250 FOR IP): Performed by: NURSE PRACTITIONER

## 2020-08-30 PROCEDURE — 1240000000 HC EMOTIONAL WELLNESS R&B

## 2020-08-30 PROCEDURE — 99231 SBSQ HOSP IP/OBS SF/LOW 25: CPT | Performed by: NURSE PRACTITIONER

## 2020-08-30 PROCEDURE — 6370000000 HC RX 637 (ALT 250 FOR IP): Performed by: PSYCHIATRY & NEUROLOGY

## 2020-08-30 PROCEDURE — 6370000000 HC RX 637 (ALT 250 FOR IP): Performed by: PHYSICIAN ASSISTANT

## 2020-08-30 PROCEDURE — 82948 REAGENT STRIP/BLOOD GLUCOSE: CPT

## 2020-08-30 PROCEDURE — 99231 SBSQ HOSP IP/OBS SF/LOW 25: CPT | Performed by: PSYCHIATRY & NEUROLOGY

## 2020-08-30 RX ADMIN — FERROUS SULFATE TAB 325 MG (65 MG ELEMENTAL FE) 325 MG: 325 (65 FE) TAB at 09:03

## 2020-08-30 RX ADMIN — VENLAFAXINE HYDROCHLORIDE 225 MG: 150 CAPSULE, EXTENDED RELEASE ORAL at 09:03

## 2020-08-30 RX ADMIN — METFORMIN HYDROCHLORIDE 500 MG: 500 TABLET ORAL at 21:24

## 2020-08-30 RX ADMIN — BUPROPION HYDROCHLORIDE 300 MG: 300 TABLET, EXTENDED RELEASE ORAL at 09:03

## 2020-08-30 RX ADMIN — TRAZODONE HYDROCHLORIDE 50 MG: 50 TABLET ORAL at 21:24

## 2020-08-30 ASSESSMENT — PAIN - FUNCTIONAL ASSESSMENT: PAIN_FUNCTIONAL_ASSESSMENT: ACTIVITIES ARE NOT PREVENTED

## 2020-08-30 ASSESSMENT — PAIN SCALES - GENERAL
PAINLEVEL_OUTOF10: 0
PAINLEVEL_OUTOF10: 0

## 2020-08-30 NOTE — PLAN OF CARE
Problem: Altered Mood, Depressive Behavior:  Goal: Able to verbalize acceptance of life and situations over which he or she has no control  Description: Able to verbalize acceptance of life and situations over which he or she has no control  Outcome: Ongoing  Note: Patient does not verbalize acceptance, will continue to encourage. Goal: Able to verbalize and/or display a decrease in depressive symptoms  Description: Able to verbalize and/or display a decrease in depressive symptoms  Outcome: Ongoing  Note: Patient reports feelings of sadness, states mood is [place]. \"  Goal: Ability to disclose and discuss suicidal ideas will improve  Description: Ability to disclose and discuss suicidal ideas will improve  Outcome: Ongoing  Note: Patient denies suicidal thoughts. Goal: Absence of self-harm  Description: Absence of self-harm  Outcome: Ongoing  Note: Patient remains safe and free from harm. Goal: Patient specific goal  Description: Patient specific goal  Outcome: Ongoing  Note: Patient met goal.   Goal: Participates in care planning  Description: Participates in care planning  Outcome: Ongoing  Note: Patient participated this shift. Problem: KNOWLEDGE DEFICIT,EDUCATION,DISCHARGE PLAN  Goal: Knowledge - personal safety  Outcome: Ongoing  Note: Safety plan not completed this shift. Problem: Discharge Planning:  Goal: Discharged to appropriate level of care  Description: Discharged to appropriate level of care  Outcome: Ongoing  Note: Discharge planning is in progress. Problem: Anxiety:  Goal: Level of anxiety will decrease  Description: Level of anxiety will decrease  Outcome: Ongoing  Note: Anxiety denied. Problem: Activity:  Goal: Sleeping patterns will improve  Description: Sleeping patterns will improve  Outcome: Ongoing  Note: Patient slept 8 hours the previous night.       Problem: Role Relationship:  Goal: Ability to demonstrate positive changes in social behaviors and relationships will improve  Description: Ability to demonstrate positive changes in social behaviors and relationships will improve  8/30/2020 0426 by Eugene Rosas RN  Outcome: Ongoing  8/29/2020 1538 by Maria Del Carmen Trujillo  Outcome: Ongoing  Note: Pt has attended 1/2 groups that have been offered on the unit this shift. Pt has been out of her room after lunch and has been interacting with peers. Pt will be encouraged to attend all groups offered on the unit and to continue to interact with peers. Pt progress towards socialization goal is ongoing. Care plan reviewed with patient.   Patient does verbalize understanding of the plan of care and does contribute to goal setting

## 2020-08-30 NOTE — PLAN OF CARE
Problem: Role Relationship:  Goal: Ability to demonstrate positive changes in social behaviors and relationships will improve  Description: Ability to demonstrate positive changes in social behaviors and relationships will improve  Outcome: Met This Shift  Note: Pt has attended 3/3 groups that have been offered on the unit this shift. Pt has been out on the unit and has been interacting with peers. Pt will be encouraged to continue group attendance and interaction. Pt has met socialization goal for this shift.

## 2020-08-30 NOTE — GROUP NOTE
Group Therapy Note    Date: 8/30/2020    Group Start Time: 1000  Group End Time: 6306  Group Topic: Recreational    [facility]    TERRI Choi    Group Therapy Note    Attendees: 6         Patient's Goal:  Pt will improve socialization and knowledge of coping skills through participation of recreation music therapy session. Notes:  Pt was cooperative and pleasant during group. Pt was attentive and interactive with peers. Pt participated in group discussions and had active participation in there therapy session. Status After Intervention:  Improved    Participation Level:  Active Listener and Interactive    Participation Quality: Appropriate, Attentive and Sharing      Speech:  normal      Thought Process/Content: Logical      Affective Functioning: Congruent      Mood: euthymic      Level of consciousness:  Alert, Oriented x4 and Attentive      Response to Learning: Able to verbalize current knowledge/experience, Able to verbalize/acknowledge new learning, Able to retain information, Capable of insight, Able to change behavior and Progressing to goal      Endings: None Reported    Modes of Intervention: Education, Support, Socialization, Exploration, Clarifying, Activity and Media      Discipline Responsible: Psychoeducational Specialist      Signature:  TERRI Agarwal

## 2020-08-30 NOTE — GROUP NOTE
Group Therapy Note    Date: 8/30/2020    Group Start Time: 1400  Group End Time: 9859  Group Topic: Psychotherapy    [facility]    FILIBERTO Mueller    Notes:  Patient is present in group. Patient is active in group discussion and check-in. Patient was able to verbalize what is good and bad about today, what she was grateful for, looking forward to, and can let go of. Patient was able to give appropriate responses to all check-in questions. Patient reports that she is feeling conflicted about what to do with her school classes and work schedule. She reports that her school/work balance has significantly contributed to her increased depression and anxiety. Patient able to receive SW and peer support/feedback appropriately. Status After Intervention:  Improved    Participation Level:  Active Listener and Interactive    Participation Quality: Appropriate, Attentive, Sharing and Supportive      Speech:  normal      Thought Process/Content: Logical      Affective Functioning: Congruent      Mood: euthymic      Level of consciousness:  Alert, Oriented x4 and Attentive      Response to Learning: Able to verbalize current knowledge/experience, Capable of insight and Progressing to goal      Endings: None Reported    Modes of Intervention: Education, Support, Socialization, Exploration, Clarifying and Problem-solving      Discipline Responsible: /Counselor      Signature:  FILIBERTO Mueller

## 2020-08-30 NOTE — PROGRESS NOTES
This RN has reviewed and agrees with C. Gay Najjar, LPN's data collection and has collaborated with this LPN regarding the patient's care plan.

## 2020-08-30 NOTE — PLAN OF CARE
Problem: Altered Mood, Depressive Behavior:  Goal: Able to verbalize acceptance of life and situations over which he or she has no control  Description: Able to verbalize acceptance of life and situations over which he or she has no control  8/30/2020 1043 by Julius Joshi LPN  Outcome: Ongoing  Note: . Patient able to verbalize acceptance of life and situations over which he has no control. 8/30/2020 0426 by Chaz Mane RN  Outcome: Ongoing  Note: Patient does not verbalize acceptance, will continue to encourage. Goal: Able to verbalize and/or display a decrease in depressive symptoms  Description: Able to verbalize and/or display a decrease in depressive symptoms  8/30/2020 1043 by Julius Joshi LPN  Outcome: Ongoing  Note: Patient able to verbalize and or display a decrease in depressive symptoms   8/30/2020 0426 by Chaz Mane RN  Outcome: Ongoing  Note: Patient reports feelings of sadness, states mood is [place]. \"  Goal: Ability to disclose and discuss suicidal ideas will improve  Description: Ability to disclose and discuss suicidal ideas will improve  8/30/2020 1043 by Julius Joshi LPN  Outcome: Ongoing  Note: Patient able to disclose and discuss suicidal ideas will improve   8/30/2020 0426 by Chaz Mane RN  Outcome: Ongoing  Note: Patient denies suicidal thoughts. Goal: Able to verbalize support systems  Description: Able to verbalize support systems  8/30/2020 0426 by Chaz Mane RN  Outcome: Completed  Note: Patient reports having support. Goal: Absence of self-harm  Description: Absence of self-harm  8/30/2020 1043 by Julius Joshi LPN  Outcome: Ongoing  Note: No self harm behaviors were observed or reported so far this shift. Remains on every 15 minutes precautions for safety. 8/30/2020 0426 by Chaz Mane RN  Outcome: Ongoing  Note: Patient remains safe and free from harm.    Goal: Patient specific goal  Description: Patient specific goal  8/30/2020 1043 by Zuly Guevara LPN  Outcome: Ongoing  Note: Patient did not state specific goal this shift   8/30/2020 0426 by Mattie Thompson RN  Outcome: Ongoing  Note: Patient met goal.   Goal: Participates in care planning  Description: Participates in care planning  8/30/2020 1043 by Zuly Guevara LPN  Outcome: Ongoing  Note: Patient did participate in care planning    8/30/2020 0426 by Mattie Thompson RN  Outcome: Ongoing  Note: Patient participated this shift. Problem: Discharge Planning:  Goal: Discharged to appropriate level of care  Description: Discharged to appropriate level of care  8/30/2020 1043 by Zuly Guevara LPN  Outcome: Ongoing  Note: Discharge planner working with patient to achieve optimal discharge plan, specific to the needs of this patient. 8/30/2020 0426 by Mattie Thompson RN  Outcome: Ongoing  Note: Discharge planning is in progress. Problem: Anxiety:  Goal: Level of anxiety will decrease  Description: Level of anxiety will decrease  8/30/2020 1043 by Zuly Guevara LPN  Outcome: Ongoing  Note: Patient level of anxiety will decrease with medication   8/30/2020 0426 by Mattie Thompson RN  Outcome: Ongoing  Note: Anxiety denied. Problem: Activity:  Goal: Sleeping patterns will improve  Description: Sleeping patterns will improve  8/30/2020 1043 by Zuly Guevara LPN  Outcome: Ongoing  Note: Patient sleeping pattern did improved   8/30/2020 0426 by Mattie Thompson RN  Outcome: Ongoing  Note: Patient slept 8 hours the previous night.       Problem: Role Relationship:  Goal: Ability to demonstrate positive changes in social behaviors and relationships will improve  Description: Ability to demonstrate positive changes in social behaviors and relationships will improve  8/30/2020 1043 by Zuly Guevara LPN  Outcome: Ongoing  Note: Patient able to demonstrate positive changes in social behaviors and relationships will improve    8/30/2020 0426 by Siobhan Solorio RN  Outcome: Ongoing     Problem: Pain:  Goal: Pain level will decrease  Description: Pain level will decrease  Outcome: Ongoing  Note: Pain level will decrease with pain medication   Goal: Control of acute pain  Description: Control of acute pain  Outcome: Ongoing  Note: Acute pain in control with medication   Goal: Control of chronic pain  Description: Control of chronic pain  Outcome: Ongoing  Note: Chronic pain in control with pain medication . Problem: KNOWLEDGE DEFICIT,EDUCATION,DISCHARGE PLAN  Goal: Knowledge - personal safety  8/30/2020 1043 by Priyanka Jaffe LPN  Outcome: Ongoing  Note: Patient did not complete personal safety plan completed   8/30/2020 0426 by Siobhan Solorio RN  Outcome: Ongoing  Note: Safety plan not completed this shift. Care plan reviewed with patient and . Patient and  verbalize understanding of the plan of care and contribute to goal setting.

## 2020-08-30 NOTE — PLAN OF CARE
Problem: Altered Mood, Depressive Behavior:  Goal: Able to verbalize acceptance of life and situations over which he or she has no control  Description: Able to verbalize acceptance of life and situations over which he or she has no control  8/30/2020 1602 by Patrice Mathis LPN  Outcome: Ongoing  Note: Patient able to verbalize acceptance of life and situations over which he has no control. 8/30/2020 1043 by Patrice Mathis LPN  Outcome: Ongoing  Note: . Patient able to verbalize acceptance of life and situations over which he has no control. 8/30/2020 0426 by Tiffany Trinidad RN  Outcome: Ongoing  Note: Patient does not verbalize acceptance, will continue to encourage. Goal: Able to verbalize and/or display a decrease in depressive symptoms  Description: Able to verbalize and/or display a decrease in depressive symptoms  8/30/2020 1602 by Patrice Mathis LPN  Outcome: Ongoing  Note: Patient able to verbalize and display a decrease in depressive symptoms    8/30/2020 1043 by Patrice Mathis LPN  Outcome: Ongoing  Note: Patient able to verbalize and or display a decrease in depressive symptoms   8/30/2020 0426 by Tiffany Trinidad RN  Outcome: Ongoing  Note: Patient reports feelings of sadness, states mood is [place]. \"  Goal: Ability to disclose and discuss suicidal ideas will improve  Description: Ability to disclose and discuss suicidal ideas will improve  8/30/2020 1602 by Patrice Mathis LPN  Outcome: Ongoing  Note: Patient able to disclose and discuss suicidal ideas will improve  8/30/2020 1043 by Patrice Mathis LPN  Outcome: Ongoing  Note: Patient able to disclose and discuss suicidal ideas will improve   8/30/2020 0426 by Tiffany Trinidad RN  Outcome: Ongoing  Note: Patient denies suicidal thoughts.    Goal: Able to verbalize support systems  Description: Able to verbalize support systems  8/30/2020 0426 by Tiffany Trinidad RN  Outcome: Anu Beasley RN  Outcome: Ongoing  Note: Discharge planning is in progress. Problem: Anxiety:  Goal: Level of anxiety will decrease  Description: Level of anxiety will decrease  8/30/2020 1602 by John Vogel LPN  Outcome: Ongoing  Note: Pt level of anxiety will decrease with medication    8/30/2020 1043 by John Vogel LPN  Outcome: Ongoing  Note: Patient level of anxiety will decrease with medication   8/30/2020 0426 by Zahira Gomez RN  Outcome: Ongoing  Note: Anxiety denied. Problem: Activity:  Goal: Sleeping patterns will improve  Description: Sleeping patterns will improve  8/30/2020 1602 by John Vogel LPN  Outcome: Ongoing  Note: Patient sleeping pattern will prove with medication  8/30/2020 1043 by John Vogel LPN  Outcome: Ongoing  Note: Patient sleeping pattern did improved   8/30/2020 0426 by Zahira Gomez RN  Outcome: Ongoing  Note: Patient slept 8 hours the previous night. Problem: Role Relationship:  Goal: Ability to demonstrate positive changes in social behaviors and relationships will improve  Description: Ability to demonstrate positive changes in social behaviors and relationships will improve  8/30/2020 1602 by John Vogel LPN  Outcome: Ongoing  Note: Patient able to demonstrate positive  changes in social behaviors and relationships will improve    8/30/2020 1248 by Roma Blackman  Outcome: Met This Shift  Note: Pt has attended 3/3 groups that have been offered on the unit this shift. Pt has been out on the unit and has been interacting with peers. Pt will be encouraged to continue group attendance and interaction. Pt has met socialization goal for this shift.   8/30/2020 1043 by John Vogel LPN  Outcome: Ongoing  Note: Patient able to demonstrate positive changes in social behaviors and relationships will improve    8/30/2020 0426 by Zahira Gomez RN  Outcome: Ongoing     Problem: Pain:  Goal: Pain level will decrease  Description: Pain level will decrease  8/30/2020 1602 by Jay Blankenship LPN  Outcome: Ongoing  Note: Pt pain level will decrease with pain medication   8/30/2020 1043 by Jay Blankenship LPN  Outcome: Ongoing  Note: Pain level will decrease with pain medication   Goal: Control of acute pain  Description: Control of acute pain  8/30/2020 1602 by Jay Blankenship LPN  Outcome: Ongoing  Note: Pt in control of acute pain with pain ,medication   8/30/2020 1043 by Jay Blankenship, DILCIAN  Outcome: Ongoing  Note: Acute pain in control with medication   Goal: Control of chronic pain  Description: Control of chronic pain  8/30/2020 1602 by Jay Blankenship LPN  Outcome: Ongoing  Note: Pt in control of chronic pain with medication   8/30/2020 1043 by Jay Blankenship LPN  Outcome: Ongoing  Note: Chronic pain in control with pain medication . Problem: KNOWLEDGE DEFICIT,EDUCATION,DISCHARGE PLAN  Goal: Knowledge - personal safety  8/30/2020 1602 by Jay Blankenship LPN  Outcome: Ongoing  Note: Patient did not complete personal safety plan this shift   8/30/2020 1043 by Jay Blankenship LPN  Outcome: Ongoing  Note: Patient did not complete personal safety plan completed   8/30/2020 0426 by Parveen Earl RN  Outcome: Ongoing  Note: Safety plan not completed this shift. Care plan reviewed with patient and . Patient and  verbalize understanding of the plan of care and contribute to goal setting.

## 2020-08-30 NOTE — PROGRESS NOTES
Psychiatry Progress Note      CC: Major Depressive D/O recurrent severe without psychosis                   Subjective    Progress:  Terry Landers reports feeling better today. Reports slept well last night Verified slept 7.5 hours. Reports best noght of sleep she has had for a long time. Reports depression is less. Denies feelings of harm towards self or others. Denies side effects from medication. Good med compliance is verified. Reports appetite is good. States she attended groups yesterday. Reports talked to her parents, brother on friend on phone yesterday. Reports feeling more hopeful about the future. Objective    MSE:  Level of consciousness: Alert  Appearance: hospital attire, in chair and fair grooming   Behavior/Motor: no abnormalities noted   Attitude toward examiner: cooperative   Speech: Normal volume, goal directed, NRR  Mood: Euthymic  Affect: Reactive  Thought processes: Linear and goal directed   Suicidal Ideation: Denies suicidal ideations  Homicidal ideation: Denies homicidal ideations  Delusions: No evidence of delusions is observed  Perceptual Disturbance: Denies AH/VH;  No evidence of psychosis is observed. Cognition: Oriented to person, place, time and situation   Concentration fair   Memory intact   Insight: Improved   Judgment: Improved    Assessment:  Major Depressive D/O recurrent severe without psychosis    Plan:  Continue current meds as ordered  Continue to encourage group attendance.       Petros Fuller, CNP  3-    Major Depression, severe recurrent    I concur with above clinical findings and plan of care

## 2020-08-30 NOTE — GROUP NOTE
Group Therapy Note    Date: 8/30/2020    Group Start Time: 0930  Group End Time: 1000  Group Topic: [facility]    [address], [facility]    Group Therapy Note    Attendees: 7         Patient's Goal:  \"Finish my book. Talk to my mom about my academic classes. \"    Notes:  Pt progress is ongoing. Status After Intervention:  Improved    Participation Level:  Active Listener and Interactive    Participation Quality: Appropriate, Attentive and Sharing      Speech:  normal      Thought Process/Content: Logical      Affective Functioning: Congruent      Mood: euthymic      Level of consciousness:  Alert, Oriented x4 and Attentive      Response to Learning: Able to verbalize current knowledge/experience, Able to verbalize/acknowledge new learning, Able to retain information, Capable of insight, Able to change behavior and Progressing to goal      Endings: None Reported    Modes of Intervention: Education, Support, Socialization, Exploration, Clarifying, Activity and Limit-setting      Discipline Responsible: Psychoeducational Specialist      Signature:  [facility]

## 2020-08-30 NOTE — PATIENT CARE CONFERENCE
Defined Limits         Patient Currently in Pain: Yes  Daily Nutrition (WDL): Within Defined Limits    Patient Monitoring:  Frequency of Checks: 4 times per hour, close    Psychiatric Symptoms:   Depression Symptoms  Depression Symptoms: Loss of interest, Impaired concentration  Anxiety Symptoms  Anxiety Symptoms: No problems reported or observed. Sandy Symptoms  Sandy Symptoms: No problems reported or observed. Psychosis Symptoms  Delusion Type: No problems reported or observed. Suicide Risk CSSR-S:  1) Within the past month, have you wished you were dead or wished you could go to sleep and not wake up? : Yes  2) Have you actually had any thoughts of killing yourself? : Yes  3) Have you been thinking about how you might kill yourself? : Yes  5) Have you started to work out or worked out the details of how to kill yourself? Do you intend to carry out this plan? : No  6) Have you ever done anything, started to do anything, or prepared to do anything to end your life?: No  Change in Result: No Change in Plan of care: No      EDUCATION:   Learner Progress Toward Treatment Goals: Reviewed results and recommendations of this team, Reviewed group plan and strategies, Reviewed signs, symptoms and risk of self harm and violent behavior and Reviewed goals and plan of care    Method: Individual    Outcome: Verbalized understanding and Needs reinforcement    PATIENT GOALS: \"Better coping skills, improve communication (with family)\"    PLAN/TREATMENT RECOMMENDATIONS UPDATE:  1. How are you progressing toward meeting your main treatment goal?  - Improve, reports mood has improved since admission, group are helpful  2. Are there discharge barriers/lingering problems that need to be addressed?   - None at this time      3. Do you have the ability to pay for your medications?  - yes      4.   How is your group participation?    - Good    GOALS UPDATE:   Time frame for Short-Term Goals: Daily    FILIBERTO Jeff

## 2020-08-30 NOTE — PROGRESS NOTES
Group Therapy Note    Date: 8/29/2020  Start Time: 2000  End Time:  2020    Type of Group: Wrap-Up/relaxation    Patient's Goal:  \"better coping skills. \"    Notes:  Met goal    Status After Intervention:  Unchanged    Participation Level: Interactive    Participation Quality: Appropriate and Attentive      Speech:  normal      Thought Process/Content: Logical      Affective Functioning: Flat      Mood: depressed      Level of consciousness:  Alert and Oriented x4      Response to Learning: Able to verbalize current knowledge/experience, Able to verbalize/acknowledge new learning, Able to retain information, Capable of insight, Able to change behavior and Progressing to goal      Endings: None Reported    Modes of Intervention: Education and Support      Discipline Responsible: Registered Nurse      Signature:  Casey Raygoza RN

## 2020-08-31 LAB — GLUCOSE BLD-MCNC: 87 MG/DL (ref 70–108)

## 2020-08-31 PROCEDURE — 90833 PSYTX W PT W E/M 30 MIN: CPT | Performed by: PSYCHIATRY & NEUROLOGY

## 2020-08-31 PROCEDURE — 6370000000 HC RX 637 (ALT 250 FOR IP): Performed by: NURSE PRACTITIONER

## 2020-08-31 PROCEDURE — 1240000000 HC EMOTIONAL WELLNESS R&B

## 2020-08-31 PROCEDURE — 6370000000 HC RX 637 (ALT 250 FOR IP): Performed by: PHYSICIAN ASSISTANT

## 2020-08-31 PROCEDURE — APPSS30 APP SPLIT SHARED TIME 16-30 MINUTES: Performed by: PHYSICIAN ASSISTANT

## 2020-08-31 PROCEDURE — 99232 SBSQ HOSP IP/OBS MODERATE 35: CPT | Performed by: PSYCHIATRY & NEUROLOGY

## 2020-08-31 PROCEDURE — 6370000000 HC RX 637 (ALT 250 FOR IP): Performed by: PSYCHIATRY & NEUROLOGY

## 2020-08-31 PROCEDURE — 82948 REAGENT STRIP/BLOOD GLUCOSE: CPT

## 2020-08-31 RX ADMIN — BUPROPION HYDROCHLORIDE 300 MG: 300 TABLET, EXTENDED RELEASE ORAL at 08:17

## 2020-08-31 RX ADMIN — TRAZODONE HYDROCHLORIDE 50 MG: 50 TABLET ORAL at 21:06

## 2020-08-31 RX ADMIN — METFORMIN HYDROCHLORIDE 500 MG: 500 TABLET ORAL at 21:06

## 2020-08-31 RX ADMIN — VENLAFAXINE HYDROCHLORIDE 225 MG: 150 CAPSULE, EXTENDED RELEASE ORAL at 08:17

## 2020-08-31 RX ADMIN — FERROUS SULFATE TAB 325 MG (65 MG ELEMENTAL FE) 325 MG: 325 (65 FE) TAB at 08:17

## 2020-08-31 ASSESSMENT — PAIN SCALES - GENERAL
PAINLEVEL_OUTOF10: 0
PAINLEVEL_OUTOF10: 0

## 2020-08-31 NOTE — PROGRESS NOTES
Department of Psychiatry  Progress Note     Chief Complaint:  Severe episode of recurrent major depressive disorder, without psychotic features ([address]     SUBJECTIVE:    PROGRESS:  Lizzy Gillis reports she is doing good today  She feels her mood is progressively getting better  States her depression is not really there today  Denies suicidal ideation today. Did not have any yesterday  Feels hopeless and helpless at times but those feelings are improving  She denies anxiety. She continues to sleep well. Feels the Trazodone is working good. She has been attending groups and talking to her family on the phone which have been going well. Suicidal ideations: denies    Compliance with medications: good   Medication side effects: absent  ROS: Patient has new complaints:  no  Sleep quality: 8 hours  Attending groups: yes      OBJECTIVE      Medications  Current Facility-Administered Medications: venlafaxine (EFFEXOR XR) extended release capsule 225 mg, 225 mg, Oral, Daily with breakfast  acetaminophen (TYLENOL) tablet 650 mg, 650 mg, Oral, Q4H PRN  ibuprofen (ADVIL;MOTRIN) tablet 400 mg, 400 mg, Oral, Q6H PRN  hydrOXYzine (ATARAX) tablet 50 mg, 50 mg, Oral, TID PRN  traZODone (DESYREL) tablet 50 mg, 50 mg, Oral, Nightly PRN  magnesium hydroxide (MILK OF MAGNESIA) 400 MG/5ML suspension 30 mL, 30 mL, Oral, Daily PRN  aluminum & magnesium hydroxide-simethicone (MAALOX) 200-200-20 MG/5ML suspension 30 mL, 30 mL, Oral, Q6H PRN  metFORMIN (GLUCOPHAGE) tablet 500 mg, 500 mg, Oral, Dinner  ferrous sulfate (IRON 325) tablet 325 mg, 325 mg, Oral, Daily with breakfast  buPROPion (WELLBUTRIN XL) extended release tablet 300 mg, 300 mg, Oral, QAM     Physical     height is 5' 5\" (1.651 m) and weight is 213 lb 5 oz (96.8 kg). Her oral temperature is 97.6 °F (36.4 °C). Her blood pressure is 114/64 and her pulse is 92. Her respiration is 16 and oxygen saturation is 97%.    Lab Results   Component Value Date    WBC 9.2 08/28/2020    HGB 15.0 08/28/2020    HCT 46.1 08/28/2020     08/28/2020    ALT 18 08/28/2020    AST 19 08/28/2020     08/28/2020    K 4.6 08/28/2020     08/28/2020    CREATININE 0.9 08/28/2020    BUN 10 08/28/2020    CO2 25 08/28/2020          Mental Status Exam:   Level of consciousness:  within normal limits  Appearance:  well-appearing, hospital attire and in chair  Behavior/Motor:  no abnormalities noted  Attitude toward examiner:  cooperative, attentive and good eye contact  Speech:  spontaneous, normal rate and normal volume  Mood:  \"Good\"  Affect:  reactive  Thought processes:  linear, goal directed and coherent  Thought content:  Denies homicidal ideation  Suicidal Ideation:  denies suicidal ideation  Delusions:  no evidence of delusions  Perceptual Disturbance:  denies any perceptual disturbance  Cognition: Patient is oriented to person, place, time and situation  Concentration: clinically adequate  Memory: intact  Insight & Judgement: fair       ASSESSMENT     Severe episode of recurrent major depressive disorder, without psychotic features ([facility] [address]     PLAN    Patient's symptoms show significant improvement  Continue current medications as prescribed  Attempt to develop insight, psycho-education and supportive therapy conducted. Probable discharge: 1-2 days  Follow-up: TBD    Electronically signed by Keanu Duong PA-C on 8/31/2020 at 3:39 PM Reviewed patient's current plan of care and vital signs with nursing staff. Psychiatry Attending Attestation     I assessed this patient and reviewed the case and plan of care with Keanu Duong PA-C. I have reviewed the above documentation and I agree with the findings and treatment plan with the following updates. Patient feels better than before. Mood and affect are better. Patient reports fleeting suicidal thoughts with no intent or plan. Patient notes that these thoughts are occurring less frequently.   Denies any homicidal thoughts, that was explored with the patient. Oriented to time place and person. Recent and remote memory is intact. Patient feels hopeful. Sleep and appetite is good. No side effect from medication reported. Side-effect of medication were discussed with the patient . Patient is responding to current treatment. Discharge soon, if patient continues to show improvement. Case discussed with the staff. More than 16 mins of the session was spent doing Supportive psychotherapy and coordinating patient's care. Session started at 12:30pm and ended at 1pm.     Electronically signed by Margoth Allan MD on 8/31/20 at 7:50 PM EDT    **This report has been created using voice recognition software. It may contain minor errors which are inherent in voice recognition technology. **

## 2020-08-31 NOTE — GROUP NOTE
Group Therapy Note    Date: 8/31/2020    Group Start Time: 0915  Group End Time: 0945  Group Topic: Community Meeting    [facility] Adult Psych 4E    TERRI Choi    Group Therapy Note    Attendees: 7         Patient's Goal:  \"Participate more in groups. Find a timeline for discharge. \"    Notes:  Pt progress is ongoing. Status After Intervention:  Improved    Participation Level:  Active Listener and Interactive    Participation Quality: Appropriate, Attentive and Sharing      Speech:  normal      Thought Process/Content: Logical      Affective Functioning: Congruent      Mood: euthymic      Level of consciousness:  Alert, Oriented x4 and Attentive      Response to Learning: Able to verbalize current knowledge/experience, Able to verbalize/acknowledge new learning, Able to retain information, Capable of insight, Able to change behavior and Progressing to goal      Endings: None Reported    Modes of Intervention: Education, Support, Socialization, Exploration, Clarifying, Activity and Limit-setting      Discipline Responsible: Psychoeducational Specialist      Signature:  TERRI Ospina

## 2020-08-31 NOTE — GROUP NOTE
Group Therapy Note    Date: 8/31/2020    Group Start Time: 1000  Group End Time: 8351  Group Topic: Recreational    [facility] Adult Psych 4E    TERRI Choi    Group Therapy Note    Attendees: 8         Patient's Goal:  Pt will improve socialization and knowledge of coping skills through participation of recreation therapy games group. Notes:  Pt was present during group. Pt had active participation in group, but needed maximum prompting and assistance playing [facility]. Pt was interactive with peers throughout the session. Status After Intervention:  Improved    Participation Level:  Active Listener and Interactive    Participation Quality: Appropriate, Attentive and Sharing      Speech:  normal      Thought Process/Content: Logical      Affective Functioning: Congruent      Mood: euthymic      Level of consciousness:  Alert, Oriented x4 and Attentive      Response to Learning: Able to verbalize current knowledge/experience, Able to verbalize/acknowledge new learning, Able to retain information, Capable of insight, Able to change behavior and Progressing to goal      Endings: None Reported    Modes of Intervention: Education, Support, Socialization, Exploration, Clarifying and Activity      Discipline Responsible: Psychoeducational Specialist      Signature:  TERRI Kam

## 2020-08-31 NOTE — PLAN OF CARE
unsure of specific goal   8/30/2020 1043 by John Vogel LPN  Outcome: Ongoing  Note: Patient did not state specific goal this shift   Goal: Participates in care planning  Description: Participates in care planning  8/31/2020 0028 by Zahira Gomez RN  Outcome: Ongoing  Note: Patient participated this shift. 8/30/2020 1602 by John Vogel LPN  Outcome: Ongoing  Note: Patient participated in care planning   8/30/2020 1043 by John Vogel LPN  Outcome: Ongoing  Note: Patient did participate in care planning       Problem: KNOWLEDGE DEFICIT,EDUCATION,DISCHARGE PLAN  Goal: Knowledge - personal safety  8/31/2020 0028 by Zahira Gomez RN  Outcome: Ongoing  Note: Safety plan not completed. 8/30/2020 1602 by John Vogel LPN  Outcome: Ongoing  Note: Patient did not complete personal safety plan this shift   8/30/2020 1043 by John Vogel LPN  Outcome: Ongoing  Note: Patient did not complete personal safety plan completed      Problem: Discharge Planning:  Goal: Discharged to appropriate level of care  Description: Discharged to appropriate level of care  8/31/2020 0028 by Zahira Gomez RN  Outcome: Ongoing  Note: Discharge planning is in progress. 8/30/2020 1602 by John Vogel LPN  Outcome: Ongoing  Note: Discharge planner working with patient to achieve optimal discharge plan, specific to the needs of this patient. 8/30/2020 1043 by John Vogel LPN  Outcome: Ongoing  Note: Discharge planner working with patient to achieve optimal discharge plan, specific to the needs of this patient. Problem: Anxiety:  Goal: Level of anxiety will decrease  Description: Level of anxiety will decrease  8/31/2020 0028 by Zahira Gomez RN  Outcome: Ongoing  Note: Patient denies anxiety, appears calm.   8/30/2020 1602 by John Vogel LPN  Outcome: Ongoing  Note: Pt level of anxiety will decrease with medication    8/30/2020 1043 by Lidia Davis LPN  Outcome: Ongoing  Note: Patient level of anxiety will decrease with medication      Problem: Activity:  Goal: Sleeping patterns will improve  Description: Sleeping patterns will improve  8/31/2020 0028 by Dacia Mcmahan RN  Outcome: Ongoing  Note: Patient slept 7.5 hours the previous night. 8/30/2020 1602 by Lidia Davis LPN  Outcome: Ongoing  Note: Patient sleeping pattern will prove with medication  8/30/2020 1043 by Lidia Davis LPN  Outcome: Ongoing  Note: Patient sleeping pattern did improved      Problem: Role Relationship:  Goal: Ability to demonstrate positive changes in social behaviors and relationships will improve  Description: Ability to demonstrate positive changes in social behaviors and relationships will improve  8/31/2020 0028 by Dacia Mcmahan RN  Outcome: Ongoing  8/30/2020 1602 by Lidia Davis LPN  Outcome: Ongoing  Note: Patient able to demonstrate positive  changes in social behaviors and relationships will improve    8/30/2020 1248 by [id number]  Outcome: Met This Shift  Note: Pt has attended 3/3 groups that have been offered on the unit this shift. Pt has been out on the unit and has been interacting with peers. Pt will be encouraged to continue group attendance and interaction. Pt has met socialization goal for this shift. 8/30/2020 1043 by Lidia Davis LPN  Outcome: Ongoing  Note: Patient able to demonstrate positive changes in social behaviors and relationships will improve       Problem: Pain:  Goal: Pain level will decrease  Description: Pain level will decrease  8/31/2020 0028 by Dacia Mcmahan RN  Outcome: Ongoing  Note: Patient denies pain at this time.    8/30/2020 1602 by Lidia Davis LPN  Outcome: Ongoing  Note: Pt pain level will decrease with pain medication   8/30/2020 1043 by Lidia Davis LPN  Outcome:

## 2020-08-31 NOTE — PROGRESS NOTES
This RN has reviewed and agrees with FAUSTO Wolf LPN's data collection and has collaborated with this LPN regarding the patient's care plan.

## 2020-08-31 NOTE — PLAN OF CARE
Problem: Altered Mood, Depressive Behavior:  Goal: Able to verbalize and/or display a decrease in depressive symptoms  Description: Able to verbalize and/or display a decrease in depressive symptoms  Outcome: Ongoing  Note: Patient denies depressive symptoms, rates mood #9, has bright affect  Goal: Ability to disclose and discuss suicidal ideas will improve  Description: Ability to disclose and discuss suicidal ideas will improve  Outcome: Met This Shift  Note: Denies suicidal ideations  Goal: Absence of self-harm  Description: Absence of self-harm  Outcome: Met This Shift  Note: No self harm  Goal: Patient specific goal  Description: Patient specific goal  Outcome: Ongoing  Note: Goal:  participate in groups, find timeline for discharge  Goal: Participates in care planning  Description: Participates in care planning  Outcome: Met This Shift  Note: Patient participates in care planning     Problem: Discharge Planning:  Goal: Discharged to appropriate level of care  Description: Discharged to appropriate level of care  Outcome: Met This Shift  Note: Patient to be discharged to home with parents and follow up with Leila Gongora in [facility]     Problem: Anxiety:  Goal: Level of anxiety will decrease  Description: Level of anxiety will decrease  Outcome: Met This Shift  Note: Denies anxiety at this time     Problem:  Activity:  Goal: Sleeping patterns will improve  Description: Sleeping patterns will improve  Outcome: Ongoing  Note: Patient slept 8 hours during the night, will monitor     Problem: Role Relationship:  Goal: Ability to demonstrate positive changes in social behaviors and relationships will improve  Description: Ability to demonstrate positive changes in social behaviors and relationships will improve  Outcome: Ongoing  Note: improving     Problem: Pain:  Goal: Pain level will decrease  Description: Pain level will decrease  Outcome: Ongoing  Note: Denies pain at this time     Problem: KNOWLEDGE DEFICIT,EDUCATION,DISCHARGE PLAN  Goal: Knowledge - personal safety  Outcome: Ongoing  Note: Safety plan to be completed    Care plan reviewed with patient . Patient  verbalizes understanding of the plan of care and contributes to goal setting.

## 2020-08-31 NOTE — PLAN OF CARE
Problem: Role Relationship:  Goal: Ability to demonstrate positive changes in social behaviors and relationships will improve  Description: Ability to demonstrate positive changes in social behaviors and relationships will improve  Outcome: Met This Shift  Note: Pt has attended 2/2 groups that have been offered on the unit. Pt has been out on the unit and has been interacting with peers. Pt will be encouraged to continue group attendance and to continue to interact with peers while on the unit. Pt met her socialization goal for this shift.

## 2020-08-31 NOTE — PROGRESS NOTES
Group Therapy Note    Date: 8/30/2020  Start Time: 2000  End Time:  2020    Type of Group: Wrap-Up      Patient's Goal:  \"Talk to my mom to get school figured out. \"    Notes:  Met goal    Status After Intervention:  Improved    Participation Level:  Active Listener and Interactive    Participation Quality: Appropriate and Attentive      Speech:  normal      Thought Process/Content: Logical      Affective Functioning: Congruent      Mood: denies depression or anxiety      Level of consciousness:  Alert, Oriented x4 and Attentive      Response to Learning: Able to verbalize current knowledge/experience, Able to verbalize/acknowledge new learning, Able to retain information, Capable of insight, Able to change behavior and Progressing to goal      Endings: None Reported    Modes of Intervention: Education and Support      Discipline Responsible: Registered Nurse      Signature:  Kerry Cuevas RN

## 2020-09-01 VITALS
DIASTOLIC BLOOD PRESSURE: 74 MMHG | TEMPERATURE: 98.3 F | WEIGHT: 213.31 LBS | RESPIRATION RATE: 16 BRPM | HEIGHT: 65 IN | HEART RATE: 99 BPM | SYSTOLIC BLOOD PRESSURE: 133 MMHG | BODY MASS INDEX: 35.54 KG/M2 | OXYGEN SATURATION: 97 %

## 2020-09-01 PROCEDURE — 99239 HOSP IP/OBS DSCHRG MGMT >30: CPT | Performed by: PSYCHIATRY & NEUROLOGY

## 2020-09-01 PROCEDURE — 6370000000 HC RX 637 (ALT 250 FOR IP): Performed by: NURSE PRACTITIONER

## 2020-09-01 PROCEDURE — 5130000000 HC BRIDGE APPOINTMENT

## 2020-09-01 PROCEDURE — 6370000000 HC RX 637 (ALT 250 FOR IP): Performed by: PHYSICIAN ASSISTANT

## 2020-09-01 RX ORDER — VENLAFAXINE HYDROCHLORIDE 75 MG/1
225 CAPSULE, EXTENDED RELEASE ORAL
Qty: 90 CAPSULE | Refills: 0 | Status: SHIPPED | OUTPATIENT
Start: 2020-09-01

## 2020-09-01 RX ADMIN — FERROUS SULFATE TAB 325 MG (65 MG ELEMENTAL FE) 325 MG: 325 (65 FE) TAB at 09:27

## 2020-09-01 RX ADMIN — VENLAFAXINE HYDROCHLORIDE 225 MG: 150 CAPSULE, EXTENDED RELEASE ORAL at 09:27

## 2020-09-01 RX ADMIN — BUPROPION HYDROCHLORIDE 300 MG: 300 TABLET, EXTENDED RELEASE ORAL at 09:27

## 2020-09-01 ASSESSMENT — PAIN SCALES - GENERAL: PAINLEVEL_OUTOF10: 0

## 2020-09-01 NOTE — PLAN OF CARE
Problem: KNOWLEDGE DEFICIT,EDUCATION,DISCHARGE PLAN  Goal: Knowledge - personal safety  9/1/2020 1039 by Maryjane Tristan LPN  Outcome: Completed  Note: 1. Warning signs that happen when I am distressed or experience harmful thoughts   Looking \"zoned out\". Rubbing arm/wrist, heart pounding  2. Activities that I can do to cope in a healthy way when I am stressed or distressed   Go for a walk, sit outside, deep breathing, call a friend, listen/sing to music, spend time w/ pets  3. List of roadblocks that keep me from using healthy coping skills  Feeling work or a school deadline is more important; bad weather, thinking friend will be annoyed  4. List of my support persons  Mom, Dad, Amara Rojasic (friend), [place], [place]    EMERGENCY CONTACTS:  -National suicide prevention life line 1-677.164.3738  -24 hour crisis line 3-797-HOPE (1446)  -Domestic violence hotline 0-860-788-SAFE (2957)  -Test CONNECT to 829034 to chat with a trained crisis counselor 24 hours/day 7 days a week  -CALL 911    9/1/2020 0241 by Jodie Woodard RN  Outcome: Not Met This Shift  Note: Did not complete a safety plan this shift.

## 2020-09-01 NOTE — PROGRESS NOTES
Group Therapy Note    Date: 8-  Start Time: 2000  End Time:  2020    Type of Group: Wrap-Up/Relaxation    Status After Intervention:  Unchanged    Participation Level:  Active Listener    Participation Quality: Attentive      Speech:  normal      Thought Process/Content: Linear      Affective Functioning: Blunted      Mood: euthymic      Level of consciousness:  Alert      Response to Learning: Able to verbalize current knowledge/experience      Endings: None Reported    Modes of Intervention: Support      Discipline Responsible: Registered Nurse      Signature:  Funmilayo Taylor RN

## 2020-09-01 NOTE — PROGRESS NOTES
Behavioral Health   Discharge Note    Pt discharged with followings belongings:   Dentures: None  Vision - Corrective Lenses: None  Hearing Aid: Left hearing aid  Jewelry: Earrings  Body Piercings Removed: No  Clothing: None  Were All Patient Medications Collected?: Not Applicable  Other Valuables: None   Valuables sent home with patient. Patient left department with hospital transport staff via self-ambulation. Discharged to private residence and transported home in private vehicle. \"An Important Message from [employer] About Your Rights\" form photocopy original from admission and provided to pt at discharge N/A. Patient education on aftercare instructions: yes  Bridge Appointment completed: yes Reviewed Discharge Instructions with patient. Patient verbalizes understanding and agreement with the discharge plan using the teachback method. Patient verbalize understanding of AVS: yes.     Status EXAM upon discharge:  Status and Exam  Normal: Yes  Facial Expression: Brightened  Affect: Appropriate  Level of Consciousness: Alert  Mood:Normal: Yes  Mood: Other (Comment)(euthymic; rates mood 8/10)  Motor Activity:Normal: Yes  Motor Activity: Decreased  Interview Behavior: Cooperative  Preception: [place] to Person, Michelle Sauger to Time, [place] to Place, [place] to Situation  Attention:Normal: Yes  Attention: Distractible  Thought Processes: Circumstantial  Thought Content:Normal: Yes  Thought Content: Other(See Comment)  Hallucinations: None(denies)  Delusions: No  Memory:Normal: Yes  Insight and Judgment: No  Insight and Judgment: Poor Judgment  Present Suicidal Ideation: No(denies)  Present Homicidal Ideation: No(denies)    Chandrika Osborne LPN

## 2020-09-01 NOTE — PLAN OF CARE
RN  Outcome: Met This Shift  Note: Denies feeling anxious   8/31/2020 1501 by Wyatt Bergeron LPN  Outcome: Met This Shift  Note: Denies anxiety at this time     Problem: Activity:  Goal: Sleeping patterns will improve  Description: Sleeping patterns will improve  9/1/2020 0241 by Cat Flores RN  Outcome: Met This Shift  Note: Trazodone for sleep   8/31/2020 1501 by Wyatt Bergeron LPN  Outcome: Ongoing  Note: Patient slept 8 hours during the night, will monitor     Problem: Altered Mood, Depressive Behavior:  Goal: Patient specific goal  Description: Patient specific goal  9/1/2020 0241 by Cat Flores RN  Outcome: Ongoing  Note: Encouraged to set a daily goal.   8/31/2020 1501 by Wyatt Bergeron LPN  Outcome: Ongoing  Note: Goal:  participate in groups, find timeline for discharge     Problem: Role Relationship:  Goal: Ability to demonstrate positive changes in social behaviors and relationships will improve  Description: Ability to demonstrate positive changes in social behaviors and relationships will improve  9/1/2020 0241 by Cat Flores RN  Outcome: Ongoing  8/31/2020 1517 by Kendal Ro  Outcome: Met This Shift  Note: Pt has attended 2/2 groups that have been offered on the unit. Pt has been out on the unit and has been interacting with peers. Pt will be encouraged to continue group attendance and to continue to interact with peers while on the unit. Pt met her socialization goal is ongoing for this shift. 8/31/2020 1501 by Wyatt Bergeron LPN  Outcome: Ongoing  Note: improving     Problem: KNOWLEDGE DEFICIT,EDUCATION,DISCHARGE PLAN  Goal: Knowledge - personal safety  9/1/2020 0241 by Cat Flores RN  Outcome: Not Met This Shift  Note: Did not complete a safety plan this shift.    8/31/2020 1501 by Wyatt Bergeron LPN  Outcome: Ongoing  Note: Safety plan to be completed     Problem: Pain:  Goal: Pain level will decrease  Description: Pain level will decrease  9/1/2020 0241 by Nate Elizabteh

## 2020-09-01 NOTE — PROGRESS NOTES
This RN has reviewed and agrees with Leni Peterson LPN's data collection and has collaborated with this LPN regarding the patient's care plan.

## 2020-09-01 NOTE — DISCHARGE SUMMARY
depression     Hospital Course:   Upon admission, Willis Abreu was provided a safe secure environment, introduced to unit milieu. Patient participated in groups and individual therapies. Meds were adjusted as noted below. After few days of hospital care, patient began to feel improvement. Depression lifted, thoughts to harm self ceased. Sleep improved, appetite was good. On morning rounds 9/1/2020, Willis Abreu  endorses feeling ready for discharge. Patient denies suicidal or homicidal ideations, denies hallucinations or delusions. Denies SE's from meds. It was decided that maximum benefit from hospital care had been achieved and patient can be discharged. Consults:    none    Significant Diagnostic Studies: Routine labs and diagnostics    Treatments: Psychotropic medications, therapy with group, milieu, and 1:1 with nurses, social workers, PABERENICE/CNP, and Attending physician.       Discharge Medications:  Current Discharge Medication List      CONTINUE these medications which have CHANGED    Details   venlafaxine (EFFEXOR XR) 75 MG extended release capsule Take 3 capsules by mouth daily (with breakfast)  Qty: 90 capsule, Refills: 0         CONTINUE these medications which have NOT CHANGED    Details   ferrous sulfate (IRON 325) 325 (65 Fe) MG tablet Take 325 mg by mouth daily (with breakfast)      SUMAtriptan (IMITREX) 25 MG tablet Take 25 mg by mouth once as needed for Migraine      metFORMIN (GLUCOPHAGE) 500 MG tablet Take 500 mg by mouth Daily with supper       buPROPion (WELLBUTRIN XL) 300 MG extended release tablet Take 300 mg by mouth every morning                Discharge Exam:  Level of consciousness:  Within normal limits  Appearance: Street clothes, seated, with good grooming  Behavior/Motor: No abnormalities noted  Attitude toward examiner:  Cooperative, attentive, good eye contact  Speech:  spontaneous, normal rate, normal volume and well articulated  Mood:  euthymic  Affect:  Full

## 2020-09-02 ENCOUNTER — TELEPHONE (OUTPATIENT)
Dept: PSYCHIATRY | Age: 20
End: 2020-09-02

## 2020-09-02 NOTE — TELEPHONE ENCOUNTER
4E Post Discharge Call Back Program. Called patient. Patient reported that there were no issues with their discharge.

## 2025-04-29 NOTE — ED NOTES
ED to inpatient nurses report    Chief Complaint   Patient presents with    Suicidal    Psychiatric Evaluation      Present to ED from home  LOC: alert and orientated to name, place, date  Vital signs   Vitals:    08/28/20 1014 08/28/20 1152   BP: (!) 139/102    Pulse: 117 100   Resp: 16    Temp: 98 °F (36.7 °C)    TempSrc: Oral    SpO2: 100%    Weight: 216 lb (98 kg)    Height: 5' 5\" (1.651 m)       Oxygen Baseline room air     Current needs required none  Bipap/Cpap No  LDAs:    Mobility: Independent  Pending ED orders: n/a   Present condition: pt arrived with c/o suicidal thoughts.  Pt stated she has feeling overwhelmed and depressed     Electronically signed by Silver Macias RN on 8/28/2020 at 12:25 PM     Silver Macias RN  08/28/20 2100
Patient resting on cot, respirations are easy and unlabored. Updated on POC. Patient denies of any needs or concerns at this time. Patient's mother is at bedside.       Linda Tilley RN  08/28/20 6415
Pt mother has pt belongings at this time.       Sun Rocha RN  08/28/20 9173
Class I (easy) - visualization of the soft palate, fauces, uvula, and both anterior and posterior pillars
Class II - visualization of the soft palate, fauces, and uvula